# Patient Record
(demographics unavailable — no encounter records)

---

## 2024-11-13 NOTE — ASSESSMENT
[FreeTextEntry1] : ASSESSMENT: The patient comes in today with acute onset of right shoulder discomfort unfortunately complicated by development of frozen shoulder i.e. adhesive capsulitis.  We have discussed prognosis of this condition.  With this in mind I do recommend physical therapy the patient does elect for an injection.  It should be very helpful   The patient was adequately and thoroughly informed of my assessment of their current condition(s).  - This may diminish bodily function for the extremity. We discussed prognosis, tx modalities including operative and nonoperative options for the above diagnostic assessment. As always, 2nd opinion is always provided as an option.  When accessible, I was able to review other physicians note(s) including reviewing other tests, imaging results as well as personally view these results for my own interpretation.   [Right] SUBACROMIAL SHOULDER INJECTION   Indication:  subacromial bursitis/impingement, pain   Risk, benefits and alternatives were discussed with the patient. Potential complications include bleeding and infection. Alcohol was used to prep the area.  Ethyl chloride spray was used as a topical anesthetic.  Using sterile technique, the injection needle was then directed from a standard posterior approach parallel to and inferior to the acromion. A 21g needle was used to inject 5 mL of 1% Lidocaine and 1 unit 10mg Kenalog.  No significant resistance was encountered.  A bandage was applied.  The patient tolerated the procedure well.    Patient instructed to avoid strenuous activity for 2 day(s). Specifically counseled regarding the signs and symptoms of potential infection and instructed to present promptly to clinic or hospital if such signs and symptoms arise.  The patient was adequately and thoroughly informed of my assessment of their current condition(s).  DISCUSSION: 1.  Right shoulder injection.  PT prescription provided.  Follow-up 2 months.  Consider repeat CSI for capsulitis 2. [x] 3. [x]

## 2024-11-13 NOTE — HISTORY OF PRESENT ILLNESS
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  Denies diabetes

## 2024-11-13 NOTE — PHYSICAL EXAM
[de-identified] :  Examination of the [right] shoulder reveals severe loss of both passive and active range of motion equally.  No signs of infection on examination [de-identified] : [4] views of [right] shoulder were obtained today in my office and were seen by me and discussed with the patient.  These [show findings consistent with AC arthropathy and grossly preserved glenohumeral joint space] -do not see obvious gross fracture or dislocation

## 2025-03-20 NOTE — HISTORY OF PRESENT ILLNESS
[FreeTextEntry1] : Antonina is a very pleasant 52-year-old female presenting today with history of right shoulder discomfort.  For this that she has trialed physical therapy which has been very helpful.  She is status post injection which has been very helpful as well however describes recurrence of symptoms.  Patient states that her range of motion has returned beautifully however is having discomfort with daily activities. She also describes burning and numbness and tingling in the fingers and hand.

## 2025-03-20 NOTE — ASSESSMENT
[FreeTextEntry1] : ASSESSMENT: The patient comes in today with symptoms of right shoulder discomfort recurring.  Her stiffness has improved tremendously with the help of physical therapy and prior injection.  We have discussed symptoms of tendinopathy bursitis impingement.  We have also discussed carpal tunnel type symptoms.  With this in mind we have discussed treatment options thoroughly.  Today patient does elect for injections which should be very helpful   The patient was adequately and thoroughly informed of my assessment of their current condition(s).  - This may diminish bodily function for the extremity. We discussed prognosis, tx modalities including operative and nonoperative options for the above diagnostic assessment. As always, 2nd opinion is always provided as an option.  When accessible, I was able to review other physicians note(s) including reviewing other tests, imaging results as well as personally view these results for my own interpretation.   [Right] SUBACROMIAL SHOULDER INJECTION   Indication:  subacromial bursitis/impingement, pain   Risk, benefits and alternatives were discussed with the patient. Potential complications include bleeding and infection. Alcohol was used to prep the area.  Ethyl chloride spray was used as a topical anesthetic.  Using sterile technique, the injection needle was then directed from a standard posterior approach parallel to and inferior to the acromion. A 21g needle was used to inject 5 mL of 1% Lidocaine and 1 unit 10mg Kenalog.  No significant resistance was encountered.  A bandage was applied.  The patient tolerated the procedure well.    Patient instructed to avoid strenuous activity for 2 day(s). Specifically counseled regarding the signs and symptoms of potential infection and instructed to present promptly to clinic or hospital if such signs and symptoms arise.  Injection:   The risks and benefits of a steroid injection were discussed in detail. The risks include but are not limited to: pain, infection, swelling, flare response, bleeding, subcutaneous fat atrophy, skin depigmentation and/or elevation of blood sugar. The risk of incomplete resolution of symptoms, recurrence and additional intervention was reviewed and considered by the patient. The patient agreed to proceed and under a sterile prep, I injected 1 unit 6mg into 1 cc of a combination of Celestone and Lidocaine into the right carpal tunnel. The patient tolerated the injection well.  The patient was adequately and thoroughly informed of my assessment of their current condition(s).  DISCUSSION: 1.  Right shoulder and right carpal tunnel injections as above.  PT/HEP/activity modification 2.  Follow-up 6 weeks 3. [x]

## 2025-03-20 NOTE — PHYSICAL EXAM
[de-identified] : Examination of the [right] shoulder reveals equal active and passive motion as compared to the contralateral side There is a positive Speed, positive Sacha, positive Rutledge, tenderness with anterior shoulder compression. 3+/5 strength  Exam [right] wrist + Durkan's with + N/T. There is + tinel. Patient is able to delineate numbness to median-sided digits volarly. [No obvious thenar atrophy] [de-identified] : [4] views of [right] shoulder were reviewed today in my office and were seen by me and discussed with the patient.  These [show findings consistent with AC arthropathy and grossly preserved glenohumeral joint space] -do not see obvious gross fracture or dislocation